# Patient Record
Sex: MALE | Race: WHITE | NOT HISPANIC OR LATINO | Employment: UNEMPLOYED | URBAN - NONMETROPOLITAN AREA
[De-identification: names, ages, dates, MRNs, and addresses within clinical notes are randomized per-mention and may not be internally consistent; named-entity substitution may affect disease eponyms.]

---

## 2024-11-21 ENCOUNTER — EVALUATION (OUTPATIENT)
Dept: PHYSICAL THERAPY | Facility: CLINIC | Age: 39
End: 2024-11-21
Payer: COMMERCIAL

## 2024-11-21 DIAGNOSIS — M21.542 ACQUIRED LEFT CLUBFOOT: ICD-10-CM

## 2024-11-21 DIAGNOSIS — M47.816 LUMBAR SPONDYLOSIS: ICD-10-CM

## 2024-11-21 DIAGNOSIS — M25.572 ACUTE LEFT ANKLE PAIN: Primary | ICD-10-CM

## 2024-11-21 DIAGNOSIS — M96.0 PSEUDARTHROSIS AFTER FUSION OR ARTHRODESIS: ICD-10-CM

## 2024-11-21 PROCEDURE — 97112 NEUROMUSCULAR REEDUCATION: CPT | Performed by: PHYSICAL THERAPIST

## 2024-11-21 PROCEDURE — 97162 PT EVAL MOD COMPLEX 30 MIN: CPT | Performed by: PHYSICAL THERAPIST

## 2024-11-21 PROCEDURE — 97110 THERAPEUTIC EXERCISES: CPT | Performed by: PHYSICAL THERAPIST

## 2024-11-21 NOTE — PROGRESS NOTES
PT Evaluation     Today's date: 2024  Patient name: Shivam Mccoy  : 1985  MRN: [id number]  Referring provider: Shabbir Webster DPM  Dx:   Encounter Diagnosis     ICD-10-CM    1. Acute left ankle pain  M25.572       2. Pseudarthrosis after fusion or arthrodesis  M96.0       3. Acquired left clubfoot  M21.542       4. Lumbar spondylosis  M47.816           Start Time: 1330  Stop Time: 1425  Total time in clinic (min): 55 minutes    Assessment  Impairments: abnormal gait, abnormal or restricted ROM, activity intolerance, impaired balance, impaired physical strength, lacks appropriate home exercise program, pain with function and activity limitations  Symptom irritability: moderate    Assessment details: Patient is a 40 y/o male s/p L ankle fusion surgery performed on 2024. Patient has well healed incision sites to the L ankle. There is noted hypomobility to the L talocrural joint of the L ankle appropriate with the previously mentioned surgery. He has a lack of proximal strength most noted to the L hip during strength assessment today. He has some tenderness noted over the plantar aspect fo the forefoot around the heads of the 1st and 2nd metatarsals. Patient was provided with a copy of hep with verbal understanding noted.   Understanding of Dx/Px/POC: good     Prognosis: good    Goals  STGs:  Patient will be independent with HEP by 2-3 visits.  Decrease ankle pain by 50% for improved tolerance with adls and home duties by 4-6 weeks.   Patient will be able to ambulate over household distances with the least necessary assistive device by 4-6 weeks.     LTGs:   Improve FOTO score from 35 to 55 indicating improved tolerance with activities involving the LE by discharge.   Patient will be able to ambulate over community distances without deviation and free of an assistance by discharge.  Patient will be able to ambulate up and down a flight of stairs without pain or deviation by discharge.   Improve L LE  strength to wnl for improved tolerance with adls and ambulation by discharge.     Plan  Patient would benefit from: skilled physical therapy  Planned modality interventions: cryotherapy    Planned therapy interventions: balance/weight bearing training, ADL retraining, manual therapy, patient education, strengthening, stretching, therapeutic activities, therapeutic exercise, flexibility, functional ROM exercises, gait training and home exercise program    Frequency: 2-3x week  Duration in weeks: 8  Plan of Care beginning date: 11/21/2024  Plan of Care expiration date: 1/16/2025  Treatment plan discussed with: patient  Plan details: Patient informed that from this point forward, to ensure adherence to the aforementioned plan of care, all or some of the treatment may be performed and carried out by a Physical Therapy Assistant (PTA) with supervision from a licensed Physical Therapist (PT) in accordance with [facility] Physical Therapy Practice Act.  Patient will continue to benefit from skilled physical therapy to address the functional deficits that were identified during the evaluation today. We will continue to progress the therapy program to address these functional deficits and achieve the established goals.         Subjective Evaluation    History of Present Illness  Date of surgery: 7/2/2024  Mechanism of injury: surgery  Mechanism of injury: Patient presents to out patient physical therapy with chief c/o L ankle pain. Patient is s/p L ankle revision fusion. He was NWB for 1.5 months and then WBAT in a high tide cam boot for 1.5 months. He has since transitioned into a regular sneaker and is WBAT. He is reporting a sensation of a wad of something being under the L heel when he is trying to WB to the L LE. Patient has his first surgery on the L foot in 2017 and further surgical intervention for fusion surgery in 2023 which failed so he had a revision surgery earlier this year. 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  Pain  Current pain ratin  At best pain rating: 3  At worst pain rating: 10  Location: L foot/ankle  Quality: discomfort, throbbing and dull ache  Relieving factors: rest and ice  Aggravating factors: standing, walking and stair climbing    Social Support    Employment status: not working      Objective     Active Range of Motion   Left Ankle/Foot   Dorsiflexion (kf): -2 degrees   Plantar flexion: 11 degrees   Inversion: -8 degrees   Eversion: 17 degrees   Great toe flexion: -12 degrees   Great toe extension: 52 degrees     Strength/Myotome Testing     Left Hip   Planes of Motion   Flexion: 4  Extension: 4+  Abduction: 4+  Adduction: 4+  External rotation: 3+  Internal rotation: 4+    Right Hip   Planes of Motion   Flexion: 4  Extension: 4+  Abduction: 4+  Adduction: 4+  External rotation: 4+  Internal rotation: 4+    Left Knee   Flexion: 4+  Extension: 4+    Right Knee   Flexion: 4+  Extension: 4    Left Ankle/Foot   Dorsiflexion: 3+  Plantar flexion: 3+  Inversion: 3+  Eversion: 3+    Right Ankle/Foot   Dorsiflexion: 4+  Plantar flexion: 4+  Inversion: 4+  Eversion: 4+    Tests   Left Ankle/Foot   Negative for Homans' sign.     Right Ankle/Foot   Negative for Homans' sign.              Precautions: Hx of L foot and ankle fusion surgery 2024.       Date  IE       FOTO 35 SC       Manuals                                        Neuro Re-Ed        Toe curls 5\" 10x       Toe yoga 5\" 10x       Toe splaying 15x                                       Ther Ex        Side lying hip ER isometric 5\" 15x       Clamshells 5\" 15x                                                       Ther Activity                        Gait Training             "            Modalities

## 2024-11-26 ENCOUNTER — EVALUATION (OUTPATIENT)
Dept: PHYSICAL THERAPY | Facility: CLINIC | Age: 39
End: 2024-11-26
Payer: COMMERCIAL

## 2024-11-26 DIAGNOSIS — M47.816 LUMBAR SPONDYLOSIS: Primary | ICD-10-CM

## 2024-11-26 PROCEDURE — 97110 THERAPEUTIC EXERCISES: CPT

## 2024-11-26 PROCEDURE — 97164 PT RE-EVAL EST PLAN CARE: CPT | Performed by: PHYSICAL THERAPIST

## 2024-11-26 PROCEDURE — 97112 NEUROMUSCULAR REEDUCATION: CPT

## 2024-11-26 NOTE — PROGRESS NOTES
"PT Evaluation     Today's date: 2024  Patient name: Shivam Mccoy  : 1985  MRN: [id number]  Referring provider: Shivam Juarez DO  Dx:   Encounter Diagnosis     ICD-10-CM    1. Lumbar spondylosis  M47.816             Start Time: 845  Stop Time: 930  Total time in clinic (min): 45 minutes    Assessment  Impairments: abnormal or restricted ROM, activity intolerance, impaired physical strength, lacks appropriate home exercise program and pain with function  Symptom irritability: moderate    Assessment details: Patient is here today for a re-evaluation to add his lumbar spine evaluation and treatment to his therapy sessions. He is presenting with mild to moderate limitations with lumbar rom during arom assessment with no restriction noted during prone PIVM assessment. There was no pain noted during PIVM assessment. Negative neural tension testing present to the Les. He has tenderness/muscle spasm to the B/L lumbar paraspinal musculature. Aberrant firing of the L multifidus musculature noted during prone assessment.   Understanding of Dx/Px/POC: good     Prognosis: good    Goals  STGs:  \"Patient will be independent with hep by 2-3 visits.   Decrease low back pain by 25% for improved tolerance with adls and home duties by 3-4 weeks.   Improve Lumbar rom to wfl for improved tolerance with adls and home duties by 3-4 weeks. \"    LTGs:  \"Improve FOTO score from 52 to 63 indicating improved tolerance with activities involving the low back by discharge.   Patient will demonstrate rom and strength to the lumbar spine wfl for improved tolerance with adls and home duties by discharge.   Patient will be free of radicular symptoms by discharge. \"      Plan  Patient would benefit from: skilled physical therapy  Planned modality interventions: cryotherapy and thermotherapy: hydrocollator packs    Planned therapy interventions: abdominal trunk stabilization, ADL retraining, body mechanics training, flexibility, " functional ROM exercises, home exercise program, therapeutic exercise, therapeutic activities, stretching, strengthening, postural training, patient education, joint mobilization and manual therapy    Frequency: 2x week  Duration in weeks: 6  Plan of Care beginning date: 11/26/2024  Plan of Care expiration date: 1/7/2025  Treatment plan discussed with: patient  Plan details: Patient informed that from this point forward, to ensure adherence to the aforementioned plan of care, all or some of the treatment may be performed and carried out by a Physical Therapy Assistant (PTA) with supervision from a licensed Physical Therapist (PT) in accordance with [facility] Physical Therapy Practice Act.  Patient will continue to benefit from skilled physical therapy to address the functional deficits that were identified during the evaluation today. We will continue to progress the therapy program to address these functional deficits and achieve the established goals.              Subjective Evaluation    History of Present Illness  Date of surgery: 7/2/2024  Mechanism of injury: surgery  Mechanism of injury: Patient presents to out patient physical therapy with chief c/o L ankle pain. Patient is s/p L ankle revision fusion. He was NWB for 1.5 months and then WBAT in a high tide cam boot for 1.5 months. He has since transitioned into a regular sneaker and is WBAT. He is reporting a sensation of a wad of something being under the L heel when he is trying to WB to the L LE. Patient has his first surgery on the L foot in 2017 and further surgical intervention for fusion surgery in 2023 which failed so he had a revision surgery earlier this year. Patient has a history of club foot     Update 11/26/2024:  Patient reports that his back has been bothering him since he was in [employer]. He reports intermittent radicular symptoms into the lower extremities at times. He feels that when he is sitting for prolonged periods of time  these symptoms will increase into the two legs. He has a known history of degeneration in his low back which he feels is from how he had had to compensate for his foot issues.           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  Pain  Current pain ratin  At best pain ratin  At worst pain ratin  Location: Lumbar  Quality: discomfort, throbbing, dull ache, radiating and sharp  Relieving factors: rest, ice and medications  Aggravating factors: standing, walking, lifting and sitting    Social Support    Employment status: not working      Objective     Palpation   Left   Muscle spasm in the erector spinae.   Tenderness of the erector spinae.     Right   Muscle spasm in the erector spinae.   Tenderness of the erector spinae.     Active Range of Motion     Lumbar   Flexion:  WFL  Extension:  with pain Restriction level: moderate  Left lateral flexion:  with pain Restriction level: minimal  Right lateral flexion:  with pain Restriction level: moderate  Left rotation:  WFL  Right rotation:  WFL  Left Ankle/Foot   Dorsiflexion (kf): -2 degrees   Plantar flexion: 11 degrees   Inversion: -8 degrees   Eversion: 17 degrees   Great toe flexion: -12 degrees   Great toe extension: 52 degrees     Strength/Myotome Testing     Left Hip   Planes of Motion   Flexion: 4  Extension: 4+  Abduction: 4+  Adduction: 4+  External rotation: 3+  Internal rotation: 4+    Right Hip   Planes of Motion   Flexion: 4  Extension: 4+  Abduction: 4+  Adduction: 4+  External rotation: 4+  Internal rotation: 4+    Left Knee   Flexion: 4+  Extension: 4+    Right Knee   Flexion: 4+  Extension: 4    Left Ankle/Foot   Dorsiflexion: 3+  Plantar flexion: 3+  Inversion: 3+  Eversion: 3+    Right Ankle/Foot   Dorsiflexion:  4+  Plantar flexion: 4+  Inversion: 4+  Eversion: 4+    Muscle Activation     Additional Muscle Activation Details  Aberrant firing of L multifidus.   Multifidus: R=4-/5, L=3+/5    Tests     Lumbar     Left   Negative passive SLR.     Right   Negative passive SLR.   Left Ankle/Foot   Negative for Homans' sign.     Right Ankle/Foot   Negative for Homans' sign.              Precautions: Hx of L foot and ankle fusion surgery 7/2/2024.     See treatment note by PTA for exercise chart.

## 2024-11-26 NOTE — PROGRESS NOTES
"Daily Note     Today's date: 2024  Patient name: Shivam Mccoy  : 1985  MRN: [id number]  Referring provider: Shivam Juarez DO  Dx:   Encounter Diagnosis     ICD-10-CM    1. Lumbar spondylosis  M47.816                      Subjective:  My ankle is sore and painful on both side os my lower leg.       Objective: See treatment diary below      Assessment: Tolerated treatment well. Patient would benefit from continued PT  pt began some new exercises today with good tolerance.  Pt needs verbal cues through out today to perform the exercises correctly. He reports having pain in his left ankle and lower leg through out as well as in both hips.       Plan: Continue per plan of care.      Precautions: Hx of L foot and ankle fusion surgery 2024.       Date  IE  Reassess add Lumbar      FOTO 35 SC 52 back  JF  SC      Manuals                                        Neuro Re-Ed        Toe curls 5\" 10x 5\" 10 x      Toe yoga 5\" 10x 5\" 10 x      Toe splaying 15x 15 x       SLB  5 x 10 \"      Tandem walk  3 x                       Ther Ex        Side lying hip ER isometric 5\" 15x 5 \" 15x      Clamshells 5\" 15x 5 15 x        Nustep  5 min L 5                                              Ther Activity                        Gait Training                        Modalities                               "

## 2024-11-26 NOTE — LETTER
2024    Shivam Juarez DO  [address] [place] [address]    Patient: Shivam Mccoy   YOB: 1985   Date of Visit: 2024     Encounter Diagnosis     ICD-10-CM    1. Lumbar spondylosis  M47.816           Dear Dr. Juarez:    Thank you for your recent referral of Shivam Mccoy. Please review the attached evaluation summary from Shivam's recent visit.     Please verify that you agree with the plan of care by signing the attached order.     If you have any questions or concerns, please do not hesitate to call.     I sincerely appreciate the opportunity to share in the care of one of your patients and hope to have another opportunity to work with you in the near future.       Sincerely,    Oskar Steve, PT      Referring Provider:      I certify that I have read the below Plan of Care and certify the need for these services furnished under this plan of treatment while under my care.                    Shivam Juarez DO  [address] [place] [address]  Via Fax: [phone]          PT Evaluation     Today's date: 2024  Patient name: Shivam Mccoy  : 1985  MRN: [id number]  Referring provider: Shivam Juarez DO  Dx:   Encounter Diagnosis     ICD-10-CM    1. Lumbar spondylosis  M47.816             Start Time: 0845  Stop Time: 0930  Total time in clinic (min): 45 minutes    Assessment  Impairments: abnormal or restricted ROM, activity intolerance, impaired physical strength, lacks appropriate home exercise program and pain with function  Symptom irritability: moderate    Assessment details: Patient is here today for a re-evaluation to add his lumbar spine evaluation and treatment to his therapy sessions. He is presenting with mild to moderate limitations with lumbar rom during arom assessment with no restriction noted during prone PIVM assessment. There was no pain noted during PIVM assessment. Negative neural tension  "testing present to the Les. He has tenderness/muscle spasm to the B/L lumbar paraspinal musculature. Aberrant firing of the L multifidus musculature noted during prone assessment.   Understanding of Dx/Px/POC: good     Prognosis: good    Goals  STGs:  \"Patient will be independent with hep by 2-3 visits.   Decrease low back pain by 25% for improved tolerance with adls and home duties by 3-4 weeks.   Improve Lumbar rom to wfl for improved tolerance with adls and home duties by 3-4 weeks. \"    LTGs:  \"Improve FOTO score from 52 to 63 indicating improved tolerance with activities involving the low back by discharge.   Patient will demonstrate rom and strength to the lumbar spine wfl for improved tolerance with adls and home duties by discharge.   Patient will be free of radicular symptoms by discharge. \"      Plan  Patient would benefit from: skilled physical therapy  Planned modality interventions: cryotherapy and thermotherapy: hydrocollator packs    Planned therapy interventions: abdominal trunk stabilization, ADL retraining, body mechanics training, flexibility, functional ROM exercises, home exercise program, therapeutic exercise, therapeutic activities, stretching, strengthening, postural training, patient education, joint mobilization and manual therapy    Frequency: 2x week  Duration in weeks: 6  Plan of Care beginning date: 11/26/2024  Plan of Care expiration date: 1/7/2025  Treatment plan discussed with: patient  Plan details: Patient informed that from this point forward, to ensure adherence to the aforementioned plan of care, all or some of the treatment may be performed and carried out by a Physical Therapy Assistant (PTA) with supervision from a licensed Physical Therapist (PT) in accordance with [facility] Physical Therapy Practice Act.  Patient will continue to benefit from skilled physical therapy to address the functional deficits that were identified during the evaluation today. We will " continue to progress the therapy program to address these functional deficits and achieve the established goals.              Subjective Evaluation    History of Present Illness  Date of surgery: 2024  Mechanism of injury: surgery  Mechanism of injury: Patient presents to out patient physical therapy with chief c/o L ankle pain. Patient is s/p L ankle revision fusion. He was NWB for 1.5 months and then WBAT in a high tide cam boot for 1.5 months. He has since transitioned into a regular sneaker and is WBAT. He is reporting a sensation of a wad of something being under the L heel when he is trying to WB to the L LE. Patient has his first surgery on the L foot in  and further surgical intervention for fusion surgery in  which failed so he had a revision surgery earlier this year. Patient has a history of club foot     Update 2024:  Patient reports that his back has been bothering him since he was in [employer]. He reports intermittent radicular symptoms into the lower extremities at times. He feels that when he is sitting for prolonged periods of time these symptoms will increase into the two legs. He has a known history of degeneration in his low back which he feels is from how he had had to compensate for his foot issues.           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  Pain  Current pain ratin  At best pain ratin  At worst pain ratin  Location: Lumbar  Quality: discomfort, throbbing, dull ache, radiating and sharp  Relieving factors: rest, ice and medications  Aggravating factors: standing, walking, lifting and sitting    Social Support    Employment status: not working      Objective     Palpation   Left   Muscle spasm in the erector spinae.    Tenderness of the erector spinae.     Right   Muscle spasm in the erector spinae.   Tenderness of the erector spinae.     Active Range of Motion     Lumbar   Flexion:  WFL  Extension:  with pain Restriction level: moderate  Left lateral flexion:  with pain Restriction level: minimal  Right lateral flexion:  with pain Restriction level: moderate  Left rotation:  WFL  Right rotation:  WFL  Left Ankle/Foot   Dorsiflexion (kf): -2 degrees   Plantar flexion: 11 degrees   Inversion: -8 degrees   Eversion: 17 degrees   Great toe flexion: -12 degrees   Great toe extension: 52 degrees     Strength/Myotome Testing     Left Hip   Planes of Motion   Flexion: 4  Extension: 4+  Abduction: 4+  Adduction: 4+  External rotation: 3+  Internal rotation: 4+    Right Hip   Planes of Motion   Flexion: 4  Extension: 4+  Abduction: 4+  Adduction: 4+  External rotation: 4+  Internal rotation: 4+    Left Knee   Flexion: 4+  Extension: 4+    Right Knee   Flexion: 4+  Extension: 4    Left Ankle/Foot   Dorsiflexion: 3+  Plantar flexion: 3+  Inversion: 3+  Eversion: 3+    Right Ankle/Foot   Dorsiflexion: 4+  Plantar flexion: 4+  Inversion: 4+  Eversion: 4+    Muscle Activation     Additional Muscle Activation Details  Aberrant firing of L multifidus.   Multifidus: R=4-/5, L=3+/5    Tests     Lumbar     Left   Negative passive SLR.     Right   Negative passive SLR.   Left Ankle/Foot   Negative for Homans' sign.     Right Ankle/Foot   Negative for Homans' sign.              Precautions: Hx of L foot and ankle fusion surgery 2024.     See treatment note by PTA for exercise chart.           Daily Note     Today's date: 2024  Patient name: Shivam Mccoy  : 1985  MRN: [id number]  Referring provider: Shivam Juarez DO  Dx:   Encounter Diagnosis     ICD-10-CM    1. Lumbar spondylosis  M47.816                      Subjective:  My ankle is sore and painful on both side os my lower leg.       Objective: See treatment diary  "below      Assessment: Tolerated treatment well. Patient would benefit from continued PT  pt began some new exercises today with good tolerance.  Pt needs verbal cues through out today to perform the exercises correctly. He reports having pain in his left ankle and lower leg through out as well as in both hips.       Plan: Continue per plan of care.      Precautions: Hx of L foot and ankle fusion surgery 7/2/2024.       Date 11/21 IE 11/26 Reassess add Lumbar      FOTO 35 SC 52 back  JF  SC      Manuals                                        Neuro Re-Ed        Toe curls 5\" 10x 5\" 10 x      Toe yoga 5\" 10x 5\" 10 x      Toe splaying 15x 15 x       SLB  5 x 10 \"      Tandem walk  3 x                       Ther Ex        Side lying hip ER isometric 5\" 15x 5 \" 15x      Clamshells 5\" 15x 5 15 x        Nustep  5 min L 5                                              Ther Activity                        Gait Training                        Modalities                                               "

## 2024-11-29 ENCOUNTER — OFFICE VISIT (OUTPATIENT)
Dept: PHYSICAL THERAPY | Facility: CLINIC | Age: 39
End: 2024-11-29
Payer: COMMERCIAL

## 2024-11-29 DIAGNOSIS — M96.0 PSEUDARTHROSIS AFTER FUSION OR ARTHRODESIS: ICD-10-CM

## 2024-11-29 DIAGNOSIS — M25.572 ACUTE LEFT ANKLE PAIN: ICD-10-CM

## 2024-11-29 DIAGNOSIS — M47.816 LUMBAR SPONDYLOSIS: Primary | ICD-10-CM

## 2024-11-29 PROCEDURE — 97530 THERAPEUTIC ACTIVITIES: CPT

## 2024-11-29 PROCEDURE — 97110 THERAPEUTIC EXERCISES: CPT

## 2024-11-29 NOTE — PROGRESS NOTES
"Daily Note     Today's date: 2024  Patient name: Shivam Mccoy  : 1985  MRN: [id number]  Referring provider: Shabbir Webster DPM  Dx: No diagnosis found.               Subjective: Did a lot of walking yesterday and is very sore this morning.  Unable to take antiinflammatory until after blood work next week.  Waiting on call for [place] brace for R ankle.        Objective: See treatment diary below      Assessment: Pt with more soreness today having been on hold from antiinflammatory.  Tends to walk with LLE IR and in-toeing.  Will beneift from progression of glute strengthening and ankle/foot mobility      Plan: Continue per plan of care.      Precautions: Hx of L foot and ankle fusion surgery 2024.       Date  IE  Reassess add Lumbar      FOTO 35 SC 52 back  JF  SC      Manuals                                        Neuro Re-Ed        Toe curls 5\" 10x 5\" 10 x 15x5\"     Toe yoga 5\" 10x 5\" 10 x 15x ea      Toe splaying 15x 15 x  15x 5\"     SLB  5 x 10 \" 5x10\" B     Tandem walk  3 x  3x fwd and back                     Ther Ex        Side lying hip ER isometric 5\" 15x 5 \" 15x      Clamshells 5\" 15x 5 15 x   15x 5\"     Nustep  5 min L 5      Ankle PF/DF   10ea     Ankle Inv/Ev    10ea     Adduction ball squeeze    15x 5\"                      Ther Activity                        Gait Training                        Modalities                                 "

## 2024-12-03 ENCOUNTER — APPOINTMENT (OUTPATIENT)
Dept: LAB | Facility: CLINIC | Age: 39
End: 2024-12-03
Payer: COMMERCIAL

## 2024-12-03 ENCOUNTER — OFFICE VISIT (OUTPATIENT)
Dept: PHYSICAL THERAPY | Facility: CLINIC | Age: 39
End: 2024-12-03
Payer: COMMERCIAL

## 2024-12-03 DIAGNOSIS — M25.572 ACUTE LEFT ANKLE PAIN: ICD-10-CM

## 2024-12-03 DIAGNOSIS — M45.7 ANKYLOSING SPONDYLITIS OF LUMBOSACRAL REGION (HCC): ICD-10-CM

## 2024-12-03 DIAGNOSIS — M96.0 PSEUDARTHROSIS AFTER FUSION OR ARTHRODESIS: Primary | ICD-10-CM

## 2024-12-03 DIAGNOSIS — M25.50 POLYARTHRALGIA: ICD-10-CM

## 2024-12-03 DIAGNOSIS — M47.816 LUMBAR SPONDYLOSIS: ICD-10-CM

## 2024-12-03 LAB
ANA SER QL IA: NEGATIVE
B BURGDOR IGG+IGM SER QL IA: NEGATIVE
CRP SERPL QL: 7.1 MG/L
ERYTHROCYTE [SEDIMENTATION RATE] IN BLOOD: 15 MM/HOUR (ref 0–14)

## 2024-12-03 PROCEDURE — 36415 COLL VENOUS BLD VENIPUNCTURE: CPT

## 2024-12-03 PROCEDURE — 86200 CCP ANTIBODY: CPT

## 2024-12-03 PROCEDURE — 86140 C-REACTIVE PROTEIN: CPT

## 2024-12-03 PROCEDURE — 97110 THERAPEUTIC EXERCISES: CPT

## 2024-12-03 PROCEDURE — 86430 RHEUMATOID FACTOR TEST QUAL: CPT

## 2024-12-03 PROCEDURE — 85652 RBC SED RATE AUTOMATED: CPT

## 2024-12-03 PROCEDURE — 86038 ANTINUCLEAR ANTIBODIES: CPT

## 2024-12-03 PROCEDURE — 97112 NEUROMUSCULAR REEDUCATION: CPT

## 2024-12-03 PROCEDURE — 86618 LYME DISEASE ANTIBODY: CPT

## 2024-12-03 NOTE — PROGRESS NOTES
"Daily Note     Today's date: 12/3/2024  Patient name: Shivam Mccoy  : 1985  MRN: [id number]  Referring provider: Shabbir Webster DPM  Dx:   Encounter Diagnosis     ICD-10-CM    1. Pseudarthrosis after fusion or arthrodesis  M96.0       2. Acute left ankle pain  M25.572       3. Lumbar spondylosis  M47.816           Start Time: 0845  Stop Time: 09  Total time in clinic (min): 45 minutes    Subjective:  I am sore today.  I see the Dr for the nerve block on .      Objective: See treatment diary below      Assessment: Tolerated treatment well. Patient would benefit from continued PT   pt c/o most of his pain over his lateral left foot, heel and up his latera left lower leg. Pt reports feeling tightness in his low back all of the time and gets more sore when walking and doing things around his home.  He states that when he is sitting and not doing much the pain in his back isnt as bad., pt needs verbal cues through out his program to perform the exercises correctly.  Pt reports having more pain leaving the clinic with wt bearing.       Plan: Continue per plan of care.      Precautions: Hx of L foot and ankle fusion surgery 2024.       Date  IE  Reassess add Lumbar 11/29 12/3    FOTO 35 SC 52 back  JF  SC      Manuals                                        Neuro Re-Ed        Toe curls 5\" 10x 5\" 10 x 15x5\" 15 x  5 \"     Toe yoga 5\" 10x 5\" 10 x 15x ea  15 x     Toe splaying 15x 15 x  15x 5\" 15 x 5\"    SLB  5 x 10 \" 5x10\" B 5x  10 \" both    Tandem walk  3 x  3x fwd and back 4x F / B                    Ther Ex        Side lying hip ER isometric 5\" 15x 5 \" 15x  5\" 15 x     Clamshells 5\" 15x 5 15 x   15x 5\" 15 x  5\"    Nustep  5 min L 5  5 min L 5    Ankle PF/DF   10ea 10 x    Ankle Inv/Ev    10ea 10 x    Adduction ball squeeze    15x 5\"  15x 5\"                    Ther Activity                        Gait Training                        Modalities                                   "

## 2024-12-04 LAB — RHEUMATOID FACT SER QL LA: NEGATIVE

## 2024-12-06 ENCOUNTER — OFFICE VISIT (OUTPATIENT)
Dept: PHYSICAL THERAPY | Facility: CLINIC | Age: 39
End: 2024-12-06
Payer: COMMERCIAL

## 2024-12-06 DIAGNOSIS — M25.572 ACUTE LEFT ANKLE PAIN: ICD-10-CM

## 2024-12-06 DIAGNOSIS — M21.542 ACQUIRED LEFT CLUBFOOT: ICD-10-CM

## 2024-12-06 DIAGNOSIS — M96.0 PSEUDARTHROSIS AFTER FUSION OR ARTHRODESIS: Primary | ICD-10-CM

## 2024-12-06 DIAGNOSIS — M47.816 LUMBAR SPONDYLOSIS: ICD-10-CM

## 2024-12-06 LAB — CCP AB SER IA-ACNC: 2.8

## 2024-12-06 PROCEDURE — 97110 THERAPEUTIC EXERCISES: CPT | Performed by: PHYSICAL THERAPIST

## 2024-12-06 PROCEDURE — 97112 NEUROMUSCULAR REEDUCATION: CPT | Performed by: PHYSICAL THERAPIST

## 2024-12-06 NOTE — PROGRESS NOTES
"Daily Note     Today's date: 2024  Patient name: Shivam Mccoy  : 1985  MRN: [id number]  Referring provider: Shabbir Webster DPM  Dx:   Encounter Diagnosis     ICD-10-CM    1. Pseudarthrosis after fusion or arthrodesis  M96.0       2. Acute left ankle pain  M25.572       3. Lumbar spondylosis  M47.816       4. Acquired left clubfoot  M21.542           Start Time: 0848  Stop Time: 930  Total time in clinic (min): 42 minutes    Subjective: Patient reports continued tension to the lateral aspect of the L foot and up into the lower aspect of the L lower leg. He notes that he continues to have increased pain when the weather is cold.       Objective: See treatment diary below      Assessment: Tolerated treatment fair. Patient demonstrated fatigue post treatment, exhibited good technique with therapeutic exercises, and would benefit from continued PT Continued with progression of LE strengthening throughout the therapy session today. He offered no reports of increase to baseline pain levels throughout the session.       Plan: Continue per plan of care.  Progress treatment as tolerated.       Precautions: Hx of L foot and ankle fusion surgery 2024.       Date  IE  Reassess add Lumbar 11/29 12/3 12/6   FOTO 35 SC 52 back  JF  SC      Manuals                                        Neuro Re-Ed        Toe curls 5\" 10x 5\" 10 x 15x5\" 15 x  5 \"  5\" 20x   Toe yoga 5\" 10x 5\" 10 x 15x ea  15 x  5\" 20x   Toe splaying 15x 15 x  15x 5\" 15 x 5\" 5\" 20x   SLB  5 x 10 \" 5x10\" B 5x  10 \" both 15\" 5x Airex   Tandem walk  3 x  3x fwd and back 4x F / B 5 laps   Side stepping on airex     5 laps           Ther Ex        Side lying hip ER isometric 5\" 15x 5 \" 15x  5\" 15 x  5\" 15x   Clamshells 5\" 15x 5 15 x   15x 5\" 15 x  5\"    Nustep  5 min L 5  5 min L 5 L5 5 min   Ankle PF/DF   10ea 10 x    Ankle Inv/Ev    10ea 10 x    Adduction ball squeeze    15x 5\"  15x 5\"    Hip machine     Abd 30# 20x ea.    Bridges     NV "   Ther Activity                        Gait Training                        Modalities

## 2024-12-09 ENCOUNTER — OFFICE VISIT (OUTPATIENT)
Dept: PHYSICAL THERAPY | Facility: CLINIC | Age: 39
End: 2024-12-09
Payer: COMMERCIAL

## 2024-12-09 DIAGNOSIS — M47.816 LUMBAR SPONDYLOSIS: Primary | ICD-10-CM

## 2024-12-09 DIAGNOSIS — M96.0 PSEUDARTHROSIS AFTER FUSION OR ARTHRODESIS: ICD-10-CM

## 2024-12-09 DIAGNOSIS — M25.572 ACUTE LEFT ANKLE PAIN: ICD-10-CM

## 2024-12-09 PROCEDURE — 97110 THERAPEUTIC EXERCISES: CPT

## 2024-12-09 PROCEDURE — 97112 NEUROMUSCULAR REEDUCATION: CPT

## 2024-12-09 NOTE — PROGRESS NOTES
"Daily Note     Today's date: 2024  Patient name: Shivam Mccoy  : 1985  MRN: [id number]  Referring provider: Shabbir Webster DPM  Dx:   Encounter Diagnosis     ICD-10-CM    1. Lumbar spondylosis  M47.816       2. Acute left ankle pain  M25.572       3. Pseudarthrosis after fusion or arthrodesis  M96.0           Start Time: 1015  Stop Time: 1100  Total time in clinic (min): 45 minutes    Subjective:  My foot is sore.       Objective: See treatment diary below      Assessment: Tolerated treatment well. Patient would benefit from continued PT  pt reports having pain through out his session today. He completed all of his exercises with pain levels staying about the same.   He did well with mat exercises today with good tolerance.  He continues to wear his ankle brace on his left ankle.,       Plan: Continue per plan of care.      Precautions: Hx of L foot and ankle fusion surgery 2024.       Date  Reassess add Lumbar 11/29 12/3 12/6   FOTO  52 back  JF  SC      Manuals                                        Neuro Re-Ed        Toe curls 5\" 20 x 5\" 10 x 15x5\" 15 x  5 \"  5\" 20x   Toe yoga 5\" 20x  5\" 10 x 15x ea  15 x  5\" 20x   Toe splaying 15x 15 x  15x 5\" 15 x 5\" 5\" 20x   SLB 15'5 x airex 5 x 10 \" 5x10\" B 5x  10 \" both 15\" 5x Airex   Tandem walk 5 laps 3 x  3x fwd and back 4x F / B 5 laps   Side stepping on airex 5 laps    5 laps           Ther Ex        Side lying hip ER isometric 5\" 15x 5 \" 15x  5\" 15 x  5\" 15x   Clamshells 5\" 15x 5 15 x   15x 5\" 15 x  5\"    Nustep L 5 6 min 5 min L 5  5 min L 5 L5 5 min   Ankle PF/DF   10ea 10 x    Ankle Inv/Ev    10ea 10 x    Adduction ball squeeze    15x 5\"  15x 5\"    Hip machine Abd  30 #  20 x    Abd 30# 20x ea.    Bridges 20 x     NV   Ther Activity                        Gait Training                        Modalities                                       "

## 2024-12-11 ENCOUNTER — OFFICE VISIT (OUTPATIENT)
Dept: PHYSICAL THERAPY | Facility: CLINIC | Age: 39
End: 2024-12-11
Payer: COMMERCIAL

## 2024-12-11 DIAGNOSIS — M21.542 ACQUIRED LEFT CLUBFOOT: ICD-10-CM

## 2024-12-11 DIAGNOSIS — M96.0 PSEUDARTHROSIS AFTER FUSION OR ARTHRODESIS: ICD-10-CM

## 2024-12-11 DIAGNOSIS — M47.816 LUMBAR SPONDYLOSIS: ICD-10-CM

## 2024-12-11 DIAGNOSIS — M25.572 ACUTE LEFT ANKLE PAIN: Primary | ICD-10-CM

## 2024-12-11 PROCEDURE — 97110 THERAPEUTIC EXERCISES: CPT | Performed by: PHYSICAL THERAPIST

## 2024-12-11 PROCEDURE — 97112 NEUROMUSCULAR REEDUCATION: CPT | Performed by: PHYSICAL THERAPIST

## 2024-12-11 PROCEDURE — 97140 MANUAL THERAPY 1/> REGIONS: CPT | Performed by: PHYSICAL THERAPIST

## 2024-12-11 NOTE — PROGRESS NOTES
"Daily Note     Today's date: 2024  Patient name: Shivam Mccoy  : 1985  MRN: [id number]  Referring provider: Shabbir Webster DPM  Dx:   Encounter Diagnosis     ICD-10-CM    1. Acute left ankle pain  M25.572       2. Lumbar spondylosis  M47.816       3. Pseudarthrosis after fusion or arthrodesis  M96.0       4. Acquired left clubfoot  M21.542           Start Time: 0845  Stop Time: 948  Total time in clinic (min): 63 minutes    Subjective: Patient says his foot is cranky, and is taking meds. During the session, patient complained of foot pain that radiates throughout the lower leg. Still feels just about the same as last session and is planning to see the doctor later.       Objective: See treatment diary below      Assessment: Tolerated treatment well. Patient exhibited good technique with therapeutic exercises and would benefit from continued PT. Patient complained of discomfort throughout the session but tolerated and performed each exercise, as additional resistance was added with foot doming and and side lying ER isometric. Resistance was added to address the lack of strength at the left foot. Mild restriction was seen throughout the rest of the of the foot during manual, with most resistance at the third metatarsal.       Plan: Continue per plan of care.      Precautions: Hx of L foot and ankle fusion surgery 2024.       Date 12/9 12/11 11/29 12/3 12/6   FOTO        Manuals        PROM Forefoot/Midfoot  8 min                              Neuro Re-Ed        Toe curls 5\" 20 x  15x5\" 15 x  5 \"  5\" 20x   Toe yoga 5\" 20x  5\" 20x 15x ea  15 x  5\" 20x   Toe splaying 15x  15x 5\" 15 x 5\" 5\" 20x   SLB 15'5 x airex 20'5 x airex 5x10\" B 5x  10 \" both 15\" 5x Airex   Tandem walk 5 laps 7 laps 3x fwd and back 4x F / B 5 laps   Side stepping on airex 5 laps 7 laps    5 laps   Foot doming  5\" 20x              Ther Ex        Side lying hip ER isometric 5\" 15x 5\" 20x 2 #  5\" 15 x  5\" 15x   Clamshells    5\" 15x  " "15x 5\" 15 x  5\"    Clamshells L2  10\" 10x      Nustep L 5 6 min   5 min L 5 L5 5 min   Recumbent bike  L 1 7 min      Ankle PF/DF   10ea 10 x    Ankle Inv/Ev    10ea 10 x    Adduction ball squeeze    15x 5\"  15x 5\"    Hip machine Abd  30 #  20 x Abd  30 #  20 x   Abd 30# 20x ea.    Back extension  2x15  55 #      Bridges 20 x     NV   Ther Activity                        Gait Training                        Modalities                                         "

## 2024-12-16 ENCOUNTER — OFFICE VISIT (OUTPATIENT)
Dept: PHYSICAL THERAPY | Facility: CLINIC | Age: 39
End: 2024-12-16
Payer: COMMERCIAL

## 2024-12-16 DIAGNOSIS — M96.0 PSEUDARTHROSIS AFTER FUSION OR ARTHRODESIS: Primary | ICD-10-CM

## 2024-12-16 DIAGNOSIS — M25.572 ACUTE LEFT ANKLE PAIN: ICD-10-CM

## 2024-12-16 DIAGNOSIS — M47.816 LUMBAR SPONDYLOSIS: ICD-10-CM

## 2024-12-16 PROCEDURE — 97112 NEUROMUSCULAR REEDUCATION: CPT

## 2024-12-16 PROCEDURE — 97110 THERAPEUTIC EXERCISES: CPT

## 2024-12-16 PROCEDURE — 97140 MANUAL THERAPY 1/> REGIONS: CPT

## 2024-12-16 NOTE — PROGRESS NOTES
"Daily Note     Today's date: 2024  Patient name: Shivam Mccoy  : 1985  MRN: [id number]  Referring provider: Shabbir Webster DPM  Dx:   Encounter Diagnosis     ICD-10-CM    1. Pseudarthrosis after fusion or arthrodesis  M96.0       2. Lumbar spondylosis  M47.816       3. Acute left ankle pain  M25.572           Start Time: 1015  Stop Time: 1100  Total time in clinic (min): 45 minutes    Subjective:  I wasn't on my feet a lot yesterday so my foot isnt too bad so far.  My back is feeling ok., I have pain certain things., I always feel pressure. I am going to get the nerves burned on . I saw the Dr and he wants me to continue for 6 more weeks.       Objective: See treatment diary below      Assessment: Tolerated treatment well. Patient would benefit from continued PT    pt went through his exercises today with no change in his pain levels.  He had some mild back pain today through out. He states that the more he walks and is on his feet the pain in his back gets worse.  He completed his foot and ankle exercises with some pain with all exercises. He is still using the ankle brace when walking.       Plan: Continue per plan of care.      Precautions: Hx of L foot and ankle fusion surgery 2024.       Date 12/9 12/11 12/16 12/3 12/6   FOTO   Back 48   Ankle 38   JF     Manuals        PROM Forefoot/Midfoot  8 min 8 min                             Neuro Re-Ed        Toe curls 5\" 20 x  15x5\" 15 x  5 \"  5\" 20x   Toe yoga 5\" 20x  5\" 20x 15x ea   5\"  15 x  5\" 20x   Toe splaying 15x  15x 5\" 15 x 5\" 5\" 20x   SLB 15'5 x airex 20'5 x airex 5x10\" B  5x  10 \" both 15\" 5x Airex   Tandem walk 5 laps 7 laps 7 laps 4x F / B 5 laps   Side stepping on airex 5 laps 7 laps  7 laps  5 laps   Foot doming  5\" 20x 5\"  20 x             Ther Ex        Side lying hip ER isometric 5\" 15x 5\" 20x 2 # 5\" 20 x 2 # 5\" 15 x  5\" 15x   Clamshells    5\" 15x  15x 5\" 15 x  5\"    Clamshells L2  10\" 10x 10\" 10 x     Nustep L 5 6 min  8 " "min   L 6 5 min L 5 L5 5 min   Recumbent bike  L 1 7 min L 1 7 min     Ankle PF/DF    10 x    Ankle Inv/Ev     10 x    Adduction ball squeeze     15x 5\"    Hip machine Abd  30 #  20 x Abd  30 #  20 x Abd  30 # 20 x  Abd 30# 20x ea.    Back extension  2x15  55 # 2x15   85#     Bridges 20 x   20 x  NV   Ther Activity                        Gait Training                        Modalities                                           "

## 2024-12-18 ENCOUNTER — OFFICE VISIT (OUTPATIENT)
Dept: PHYSICAL THERAPY | Facility: CLINIC | Age: 39
End: 2024-12-18
Payer: COMMERCIAL

## 2024-12-18 DIAGNOSIS — M25.572 ACUTE LEFT ANKLE PAIN: Primary | ICD-10-CM

## 2024-12-18 DIAGNOSIS — M96.0 PSEUDARTHROSIS AFTER FUSION OR ARTHRODESIS: ICD-10-CM

## 2024-12-18 DIAGNOSIS — M47.816 LUMBAR SPONDYLOSIS: ICD-10-CM

## 2024-12-18 PROCEDURE — 97112 NEUROMUSCULAR REEDUCATION: CPT

## 2024-12-18 PROCEDURE — 97140 MANUAL THERAPY 1/> REGIONS: CPT

## 2024-12-18 PROCEDURE — 97110 THERAPEUTIC EXERCISES: CPT

## 2024-12-18 NOTE — PROGRESS NOTES
"Daily Note     Today's date: 2024  Patient name: Shivam Mccoy  : 1985  MRN: [id number]  Referring provider: Shabbir Webster DPM  Dx:   Encounter Diagnosis     ICD-10-CM    1. Acute left ankle pain  M25.572       2. Lumbar spondylosis  M47.816       3. Pseudarthrosis after fusion or arthrodesis  M96.0           Start Time: 1015  Stop Time: 1100  Total time in clinic (min): 45 minutes    Subjective:  I feel better when I take my anti inflammatory meds.  My foot is sore .      Objective: See treatment diary below      Assessment: Tolerated treatment well. Patient would benefit from continued PT  we increased his reps for some exercises and reports having some mild pain in his right low back.  Pt completed his back exercises with some mild pain through out.  He states that he feels some improvement in his left foot since doing the exercises.  He has most of his pain over his left heel and over the outside of his left foot.  He continues to wear the ankle brace on his left ankle.       Plan: Continue per plan of care.      Precautions: Hx of L foot and ankle fusion surgery 2024.       Date    FOTO   Back 48   Ankle 38   JF     Manuals        PROM Forefoot/Midfoot  8 min 8 min 8 min                            Neuro Re-Ed        Toe curls 5\" 20 x  15x5\" 15 x  5 \"  5\" 20x   Toe yoga 5\" 20x  5\" 20x 15x ea   5\"  15 x  5\" 20x   Toe splaying 15x  15x 5\" 15 x 5\" 5\" 20x   SLB 15'5 x airex 20'5 x airex 5x10\" B  5x  10 \" both 15\" 5x Airex   Tandem walk 5 laps 7 laps 7 laps 7 laps 5 laps   Side stepping on airex 5 laps 7 laps  7 laps 7 laps 5 laps   Foot doming  5\" 20x 5\"  20 x 5\" 20 x            Ther Ex        Side lying hip ER isometric 5\" 15x 5\" 20x 2 # 5\" 20 x 2 # 5\" 15 x  5\" 15x   Clamshells    5\" 15x  15x 5\" 15 x  5\"    Clamshells L2  10\" 10x 10\" 10 x 10 \" 10 x    Nustep L 5 6 min  8 min   L 6 5 min L 5 L5 5 min   Recumbent bike  L 1 7 min L 1 7 min 8 min L 1    Ankle PF/DF      " "  Ankle Inv/Ev         Adduction ball squeeze         Hip machine Abd  30 #  20 x Abd  30 #  20 x Abd  30 # 20 x Abd 30 #  20 x Abd 30# 20x ea.    Back extension  2x15  55 # 2x15   85# 2 x 15   85#    Bridges 20 x   20 x  30 x  5 \" NV   Ther Activity                        Gait Training                        Modalities                                             "

## 2024-12-26 ENCOUNTER — EVALUATION (OUTPATIENT)
Dept: PHYSICAL THERAPY | Facility: CLINIC | Age: 39
End: 2024-12-26
Payer: COMMERCIAL

## 2024-12-26 DIAGNOSIS — M25.572 ACUTE LEFT ANKLE PAIN: Primary | ICD-10-CM

## 2024-12-26 DIAGNOSIS — M96.0 PSEUDARTHROSIS AFTER FUSION OR ARTHRODESIS: ICD-10-CM

## 2024-12-26 DIAGNOSIS — M47.816 LUMBAR SPONDYLOSIS: ICD-10-CM

## 2024-12-26 DIAGNOSIS — M21.542 ACQUIRED LEFT CLUBFOOT: ICD-10-CM

## 2024-12-26 PROCEDURE — 97110 THERAPEUTIC EXERCISES: CPT | Performed by: PHYSICAL THERAPIST

## 2024-12-26 PROCEDURE — 97140 MANUAL THERAPY 1/> REGIONS: CPT | Performed by: PHYSICAL THERAPIST

## 2024-12-26 PROCEDURE — 97112 NEUROMUSCULAR REEDUCATION: CPT | Performed by: PHYSICAL THERAPIST

## 2024-12-26 NOTE — PROGRESS NOTES
PT Re-Evaluation     Today's date: 2024  Patient name: Shivam Mccoy  : 1985  MRN: [id number]  Referring provider: Shabbir Webster DPM  Dx:   Encounter Diagnosis     ICD-10-CM    1. Acute left ankle pain  M25.572       2. Pseudarthrosis after fusion or arthrodesis  M96.0       3. Lumbar spondylosis  M47.816       4. Acquired left clubfoot  M21.542           Start Time: 1500  Stop Time: 1545  Total time in clinic (min): 45 minutes    Assessment  Impairments: abnormal gait, abnormal or restricted ROM, activity intolerance, impaired balance, impaired physical strength, lacks appropriate home exercise program, pain with function and activity limitations  Symptom irritability: moderate    Assessment details: Patient has attended 10 physical therapy visits to date. He continues to restriction to L ankle mobility which is to be expected secondary to the surgical procedure he had performed. He continues to present with weakness of the L ankle through the available range. Great toe mobility is improving along with intrinsic strength as noted during therapy interventions. There was improved tolerance for ambulation and balance activities today with less UE assistance required.   Understanding of Dx/Px/POC: good     Prognosis: good    Goals  STGs:  Patient will be independent with HEP by 2-3 visits. - MET  Decrease ankle pain by 50% for improved tolerance with adls and home duties by 4-6 weeks. - Progressing  Patient will be able to ambulate over household distances with the least necessary assistive device by 4-6 weeks. - Progressing    LTGs:   Improve FOTO score from 35 to 55 indicating improved tolerance with activities involving the LE by discharge. - Progressing  Patient will be able to ambulate over community distances without deviation and free of an assistance by discharge. - Not MET  Patient will be able to ambulate up and down a flight of stairs without pain or deviation by discharge. -  Progressing  Improve L LE strength to wnl for improved tolerance with adls and ambulation by discharge. - Progressing    Plan  Patient would benefit from: skilled physical therapy  Planned modality interventions: cryotherapy    Planned therapy interventions: balance/weight bearing training, ADL retraining, manual therapy, patient education, strengthening, stretching, therapeutic activities, therapeutic exercise, flexibility, functional ROM exercises, gait training and home exercise program    Frequency: 2-3x week  Duration in weeks: 4  Plan of Care beginning date: 12/26/2024  Plan of Care expiration date: 1/23/2025  Treatment plan discussed with: patient  Plan details: Patient informed that from this point forward, to ensure adherence to the aforementioned plan of care, all or some of the treatment may be performed and carried out by a Physical Therapy Assistant (PTA) with supervision from a licensed Physical Therapist (PT) in accordance with [facility] Physical Therapy Practice Act.  Patient will continue to benefit from skilled physical therapy to address the functional deficits that were identified during the evaluation today. We will continue to progress the therapy program to address these functional deficits and achieve the established goals.         Subjective Evaluation    History of Present Illness  Date of surgery: 7/2/2024  Mechanism of injury: surgery  Mechanism of injury: Patient presents to out patient physical therapy with chief c/o L ankle pain. Patient is s/p L ankle revision fusion. He was NWB for 1.5 months and then WBAT in a high tide cam boot for 1.5 months. He has since transitioned into a regular sneaker and is WBAT. He is reporting a sensation of a wad of something being under the L heel when he is trying to WB to the L LE. Patient has his first surgery on the L foot in 2017 and further surgical intervention for fusion surgery in 2023 which failed so he had a revision surgery earlier  this year. Patient has a history of club foot     Update 2024:  Patient reports that he continues to have difficulties with tolerance for standing/walking due to increased pain in the L ankle/foot. He feels that most of the pain is on the plantar heel of the L foot. He notes that when he is off of his feet he does not have pain but finds that there is some discomfort in the ankle and foot itself.           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  Pain  Current pain ratin  At best pain rating: 3  At worst pain rating: 10  Location: L foot/ankle  Quality: discomfort, throbbing, dull ache and pressure  Relieving factors: rest and ice  Aggravating factors: standing, walking and stair climbing    Social Support    Employment status: not working      Objective     Active Range of Motion   Left Ankle/Foot   Dorsiflexion (kf): -4 degrees   Plantar flexion: 16 degrees   Inversion: 1 degrees   Eversion: 17 degrees   Great toe flexion: -16 degrees   Great toe extension: 66 degrees     Strength/Myotome Testing     Left Hip   Planes of Motion   Flexion: 4  Extension: 4+  Abduction: 4+  Adduction: 4+  External rotation: 4-  Internal rotation: 4+    Right Hip   Planes of Motion   Flexion: 4  Extension: 4+  Abduction: 4+  Adduction: 4+  External rotation: 4+  Internal rotation: 4+    Left Knee   Flexion: 4+  Extension: 4+    Right Knee   Flexion: 4+  Extension: 4    Left Ankle/Foot   Dorsiflexion: 3+  Plantar flexion: 3+  Inversion: 3+  Eversion: 3+    Right Ankle/Foot   Dorsiflexion: 4+  Plantar flexion: 4+  Inversion: 4+  Eversion: 4+    Tests   Left Ankle/Foot   Negative for Homans' sign.     Right Ankle/Foot   Negative for Homans' sign.     Ambulation     Observational Gait   Gait: antalgic   Decreased  "walking speed.              Precautions: Hx of L foot and ankle fusion surgery 7/2/2024.       Date 12/9 12/11 12/16 12/18 12/26 Reassess Foot   FOTO   Back 48   Ankle 38   JF     Manuals        PROM Forefoot/Midfoot  8 min 8 min 8 min 8 min                           Neuro Re-Ed        Toe curls 5\" 20 x  15x5\" 15 x  5 \"     Toe yoga 5\" 20x  5\" 20x 15x ea   5\"  15 x     Toe splaying 15x  15x 5\" 15 x 5\"    SLB 15'5 x airex 20'5 x airex 5x10\" B  5x  10 \" both 20\" 4x Airex   Tandem walk 5 laps 7 laps 7 laps 7 laps 7 laps   Side stepping on airex 5 laps 7 laps  7 laps 7 laps 7 laps   Foot doming  5\" 20x 5\"  20 x 5\" 20 x            Ther Ex        Side lying hip ER isometric 5\" 15x 5\" 20x 2 # 5\" 20 x 2 # 5\" 15 x     Clamshells L2  10\" 10x 10\" 10 x 10 \" 10 x    Nustep L 5 6 min  8 min   L 6 5 min L 5 L7 6 min   Recumbent bike  L 1 7 min L 1 7 min 8 min L 1    Ankle PF/DF        Ankle Inv/Ev         Adduction ball squeeze         Hip machine Abd  30 #  20 x Abd  30 #  20 x Abd  30 # 20 x Abd 30 #  20 x    Back extension  2x15  55 # 2x15   85# 2 x 15   85# 85# 2x15   Bridges 20 x   20 x  30 x  5 \" 5\" 30x   Ther Activity                        Gait Training                        Modalities                               "

## 2024-12-26 NOTE — LETTER
2024    Shabbir Webster DPM  [address] [facility] [address]  [facility] [address]    Patient: Shivam Mccoy   YOB: 1985   Date of Visit: 2024     Encounter Diagnosis     ICD-10-CM    1. Acute left ankle pain  M25.572       2. Pseudarthrosis after fusion or arthrodesis  M96.0       3. Lumbar spondylosis  M47.816       4. Acquired left clubfoot  M21.542           Dear Dr. Webster:    Thank you for your recent referral of Shivam Mccoy. Please review the attached evaluation summary from Shivam's recent visit.     Please verify that you agree with the plan of care by signing the attached order.     If you have any questions or concerns, please do not hesitate to call.     I sincerely appreciate the opportunity to share in the care of one of your patients and hope to have another opportunity to work with you in the near future.       Sincerely,    Oskar Steve, PT      Referring Provider:      I certify that I have read the below Plan of Care and certify the need for these services furnished under this plan of treatment while under my care.                    Shabbir Webster DPM  [address]  [facility] [address]  Via Fax: [phone]          PT Re-Evaluation     Today's date: 2024  Patient name: Shivam Mccoy  : 1985  MRN: [id number]  Referring provider: Shabbir Webster DPM  Dx:   Encounter Diagnosis     ICD-10-CM    1. Acute left ankle pain  M25.572       2. Pseudarthrosis after fusion or arthrodesis  M96.0       3. Lumbar spondylosis  M47.816       4. Acquired left clubfoot  M21.542           Start Time: 1500  Stop Time: 1545  Total time in clinic (min): 45 minutes    Assessment  Impairments: abnormal gait, abnormal or restricted ROM, activity intolerance, impaired balance, impaired physical strength, lacks appropriate home exercise program, pain with function and activity limitations  Symptom irritability: moderate    Assessment details:  Patient has attended 10 physical therapy visits to date. He continues to restriction to L ankle mobility which is to be expected secondary to the surgical procedure he had performed. He continues to present with weakness of the L ankle through the available range. Great toe mobility is improving along with intrinsic strength as noted during therapy interventions. There was improved tolerance for ambulation and balance activities today with less UE assistance required.   Understanding of Dx/Px/POC: good     Prognosis: good    Goals  STGs:  Patient will be independent with HEP by 2-3 visits. - MET  Decrease ankle pain by 50% for improved tolerance with adls and home duties by 4-6 weeks. - Progressing  Patient will be able to ambulate over household distances with the least necessary assistive device by 4-6 weeks. - Progressing    LTGs:   Improve FOTO score from 35 to 55 indicating improved tolerance with activities involving the LE by discharge. - Progressing  Patient will be able to ambulate over community distances without deviation and free of an assistance by discharge. - Not MET  Patient will be able to ambulate up and down a flight of stairs without pain or deviation by discharge. - Progressing  Improve L LE strength to wnl for improved tolerance with adls and ambulation by discharge. - Progressing    Plan  Patient would benefit from: skilled physical therapy  Planned modality interventions: cryotherapy    Planned therapy interventions: balance/weight bearing training, ADL retraining, manual therapy, patient education, strengthening, stretching, therapeutic activities, therapeutic exercise, flexibility, functional ROM exercises, gait training and home exercise program    Frequency: 2-3x week  Duration in weeks: 4  Plan of Care beginning date: 12/26/2024  Plan of Care expiration date: 1/23/2025  Treatment plan discussed with: patient  Plan details: Patient informed that from this point forward, to ensure  adherence to the aforementioned plan of care, all or some of the treatment may be performed and carried out by a Physical Therapy Assistant (PTA) with supervision from a licensed Physical Therapist (PT) in accordance with [facility] Physical Therapy Practice Act.  Patient will continue to benefit from skilled physical therapy to address the functional deficits that were identified during the evaluation today. We will continue to progress the therapy program to address these functional deficits and achieve the established goals.         Subjective Evaluation    History of Present Illness  Date of surgery: 2024  Mechanism of injury: surgery  Mechanism of injury: Patient presents to out patient physical therapy with chief c/o L ankle pain. Patient is s/p L ankle revision fusion. He was NWB for 1.5 months and then WBAT in a high tide cam boot for 1.5 months. He has since transitioned into a regular sneaker and is WBAT. He is reporting a sensation of a wad of something being under the L heel when he is trying to WB to the L LE. Patient has his first surgery on the L foot in  and further surgical intervention for fusion surgery in  which failed so he had a revision surgery earlier this year. Patient has a history of club foot     Update 2024:  Patient reports that he continues to have difficulties with tolerance for standing/walking due to increased pain in the L ankle/foot. He feels that most of the pain is on the plantar heel of the L foot. He notes that when he is off of his feet he does not have pain but finds that there is some discomfort in the ankle and foot itself.           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  Pain  Current pain ratin  At best pain rating: 3  At  "worst pain rating: 10  Location: L foot/ankle  Quality: discomfort, throbbing, dull ache and pressure  Relieving factors: rest and ice  Aggravating factors: standing, walking and stair climbing    Social Support    Employment status: not working      Objective     Active Range of Motion   Left Ankle/Foot   Dorsiflexion (kf): -4 degrees   Plantar flexion: 16 degrees   Inversion: 1 degrees   Eversion: 17 degrees   Great toe flexion: -16 degrees   Great toe extension: 66 degrees     Strength/Myotome Testing     Left Hip   Planes of Motion   Flexion: 4  Extension: 4+  Abduction: 4+  Adduction: 4+  External rotation: 4-  Internal rotation: 4+    Right Hip   Planes of Motion   Flexion: 4  Extension: 4+  Abduction: 4+  Adduction: 4+  External rotation: 4+  Internal rotation: 4+    Left Knee   Flexion: 4+  Extension: 4+    Right Knee   Flexion: 4+  Extension: 4    Left Ankle/Foot   Dorsiflexion: 3+  Plantar flexion: 3+  Inversion: 3+  Eversion: 3+    Right Ankle/Foot   Dorsiflexion: 4+  Plantar flexion: 4+  Inversion: 4+  Eversion: 4+    Tests   Left Ankle/Foot   Negative for Homans' sign.     Right Ankle/Foot   Negative for Homans' sign.     Ambulation     Observational Gait   Gait: antalgic   Decreased walking speed.              Precautions: Hx of L foot and ankle fusion surgery 7/2/2024.       Date 12/9 12/11 12/16 12/18 12/26 Reassess Foot   FOTO   Back 48   Ankle 38   JF     Manuals        PROM Forefoot/Midfoot  8 min 8 min 8 min 8 min                           Neuro Re-Ed        Toe curls 5\" 20 x  15x5\" 15 x  5 \"     Toe yoga 5\" 20x  5\" 20x 15x ea   5\"  15 x     Toe splaying 15x  15x 5\" 15 x 5\"    SLB 15'5 x airex 20'5 x airex 5x10\" B  5x  10 \" both 20\" 4x Airex   Tandem walk 5 laps 7 laps 7 laps 7 laps 7 laps   Side stepping on airex 5 laps 7 laps  7 laps 7 laps 7 laps   Foot doming  5\" 20x 5\"  20 x 5\" 20 x            Ther Ex        Side lying hip ER isometric 5\" 15x 5\" 20x 2 # 5\" 20 x 2 # 5\" 15 x     Clamshells L2  " "10\" 10x 10\" 10 x 10 \" 10 x    Nustep L 5 6 min  8 min   L 6 5 min L 5 L7 6 min   Recumbent bike  L 1 7 min L 1 7 min 8 min L 1    Ankle PF/DF        Ankle Inv/Ev         Adduction ball squeeze         Hip machine Abd  30 #  20 x Abd  30 #  20 x Abd  30 # 20 x Abd 30 #  20 x    Back extension  2x15  55 # 2x15   85# 2 x 15   85# 85# 2x15   Bridges 20 x   20 x  30 x  5 \" 5\" 30x   Ther Activity                        Gait Training                        Modalities                                             "

## 2024-12-27 ENCOUNTER — EVALUATION (OUTPATIENT)
Dept: PHYSICAL THERAPY | Facility: CLINIC | Age: 39
End: 2024-12-27
Payer: COMMERCIAL

## 2024-12-27 DIAGNOSIS — M21.542 ACQUIRED LEFT CLUBFOOT: ICD-10-CM

## 2024-12-27 DIAGNOSIS — M47.816 LUMBAR SPONDYLOSIS: Primary | ICD-10-CM

## 2024-12-27 DIAGNOSIS — M96.0 PSEUDARTHROSIS AFTER FUSION OR ARTHRODESIS: ICD-10-CM

## 2024-12-27 DIAGNOSIS — M25.572 ACUTE LEFT ANKLE PAIN: ICD-10-CM

## 2024-12-27 PROCEDURE — 97112 NEUROMUSCULAR REEDUCATION: CPT | Performed by: PHYSICAL THERAPIST

## 2024-12-27 PROCEDURE — 97110 THERAPEUTIC EXERCISES: CPT | Performed by: PHYSICAL THERAPIST

## 2024-12-27 PROCEDURE — 97530 THERAPEUTIC ACTIVITIES: CPT | Performed by: PHYSICAL THERAPIST

## 2024-12-27 NOTE — PROGRESS NOTES
"PT Re-Evaluation     Today's date: 2024  Patient name: Shivam Mccoy  : 1985  MRN: [id number]  Referring provider: Shivam Juarez DO  Dx:   Encounter Diagnosis     ICD-10-CM    1. Lumbar spondylosis  M47.816       2. Acute left ankle pain  M25.572       3. Pseudarthrosis after fusion or arthrodesis  M96.0       4. Acquired left clubfoot  M21.542             Start Time: 1145  Stop Time: 1240  Total time in clinic (min): 55 minutes    Assessment  Impairments: abnormal or restricted ROM, activity intolerance, impaired physical strength, lacks appropriate home exercise program and pain with function  Symptom irritability: low    Assessment details: Patient is presenting with improvements noted to the lumbar spine during the evaluation today regarding rom and B/L Les regarding proximal joint strength. There was good response to therapy interventions which focused on progression of strengthening to the core/trunk and B/L Les.   Understanding of Dx/Px/POC: good     Prognosis: good    Goals  STGs:  \"Patient will be independent with hep by 2-3 visits.   Decrease low back pain by 25% for improved tolerance with adls and home duties by 3-4 weeks.   Improve Lumbar rom to wfl for improved tolerance with adls and home duties by 3-4 weeks. \"    LTGs:  \"Improve FOTO score from 52 to 63 indicating improved tolerance with activities involving the low back by discharge.   Patient will demonstrate rom and strength to the lumbar spine wfl for improved tolerance with adls and home duties by discharge.   Patient will be free of radicular symptoms by discharge. \"      Plan  Patient would benefit from: skilled physical therapy  Planned modality interventions: cryotherapy and thermotherapy: hydrocollator packs    Planned therapy interventions: abdominal trunk stabilization, ADL retraining, body mechanics training, flexibility, functional ROM exercises, home exercise program, therapeutic exercise, therapeutic activities, " stretching, strengthening, postural training, patient education, joint mobilization and manual therapy    Frequency: 2x week  Duration in weeks: 4  Plan of Care beginning date: 12/27/2024  Plan of Care expiration date: 1/24/2025  Treatment plan discussed with: patient  Plan details: Patient informed that from this point forward, to ensure adherence to the aforementioned plan of care, all or some of the treatment may be performed and carried out by a Physical Therapy Assistant (PTA) with supervision from a licensed Physical Therapist (PT) in accordance with [facility] Physical Therapy Practice Act.  Patient will continue to benefit from skilled physical therapy to address the functional deficits that were identified during the evaluation today. We will continue to progress the therapy program to address these functional deficits and achieve the established goals.              Subjective Evaluation    History of Present Illness  Date of surgery: 7/2/2024  Mechanism of injury: surgery  Mechanism of injury: Patient presents to out patient physical therapy with chief c/o L ankle pain. Patient is s/p L ankle revision fusion. He was NWB for 1.5 months and then WBAT in a high tide cam boot for 1.5 months. He has since transitioned into a regular sneaker and is WBAT. He is reporting a sensation of a wad of something being under the L heel when he is trying to WB to the L LE. Patient has his first surgery on the L foot in 2017 and further surgical intervention for fusion surgery in 2023 which failed so he had a revision surgery earlier this year. Patient has a history of club foot     Update 11/26/2024:  Patient reports that his back has been bothering him since he was in [employer]. He reports intermittent radicular symptoms into the lower extremities at times. He feels that when he is sitting for prolonged periods of time these symptoms will increase into the two legs. He has a known history of degeneration in his  low back which he feels is from how he had had to compensate for his foot issues.     Update 2024:  Patient reports that his low back continues to bother him. He notes that activity continues to increase the pain in his low back. He feels that when he is resting this will allow the pain to subside. He finds that when he moves a certain way he will get a pain in his low back but denies any pain radiating into his Les.           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  Pain  Current pain ratin  At best pain ratin  At worst pain ratin  Location: Lumbar  Quality: discomfort, throbbing, dull ache, radiating and sharp  Relieving factors: rest, ice and medications  Aggravating factors: standing, walking, lifting and sitting    Social Support    Employment status: not working      Objective     Palpation   Left   Muscle spasm in the erector spinae.   Tenderness of the erector spinae.     Right   Muscle spasm in the erector spinae.   Tenderness of the erector spinae.     Active Range of Motion     Lumbar   Flexion:  WFL  Extension:  with pain Restriction level: minimal  Left lateral flexion:  with pain Restriction level: minimal  Right lateral flexion:  with pain Restriction level: minimal  Left rotation:  WFL  Right rotation:  WFL  Left Ankle/Foot   Dorsiflexion (kf): -2 degrees   Plantar flexion: 11 degrees   Inversion: -8 degrees   Eversion: 17 degrees   Great toe flexion: -12 degrees   Great toe extension: 52 degrees     Strength/Myotome Testing     Left Hip   Planes of Motion   Flexion: 4+  Extension: 4+  Abduction: 4+  Adduction: 4+  External rotation: 4  Internal rotation: 4+    Right Hip   Planes of Motion   Flexion: 4+  Extension: 4+  Abduction: 4+  Adduction:  "4+  External rotation: 4+  Internal rotation: 4+    Left Knee   Flexion: 4+  Extension: 4+    Right Knee   Flexion: 4+  Extension: 4+    Left Ankle/Foot   Dorsiflexion: 3+  Plantar flexion: 3+  Inversion: 3+  Eversion: 3+    Right Ankle/Foot   Dorsiflexion: 4+  Plantar flexion: 4+  Inversion: 4+  Eversion: 4+    Muscle Activation     Additional Muscle Activation Details  Aberrant firing of L multifidus.   Multifidus: R=4-/5, L=4-/5    Tests     Lumbar     Left   Negative passive SLR.     Right   Negative passive SLR.   Left Ankle/Foot   Negative for Homans' sign.     Right Ankle/Foot   Negative for Homans' sign.              Precautions: Hx of L foot and ankle fusion surgery 7/2/2024.     Date 12/27 Reassess Lumbar 12/11 12/16 12/18 12/26 Reassess Foot   FOTO   Back 48   Ankle 38   JF     Manuals        PROM Forefoot/Midfoot  8 min 8 min 8 min 8 min                           Neuro Re-Ed        Toe curls   15x5\" 15 x  5 \"     Toe yoga  5\" 20x 15x ea   5\"  15 x     Toe splaying   15x 5\" 15 x 5\"    SLB Airex 30\" 4x ea.  20'5 x airex 5x10\" B  5x  10 \" both 20\" 4x Airex   Tandem walk 7 laps airex 7 laps 7 laps 7 laps 7 laps   Side stepping on airex 7 laps 7 laps  7 laps 7 laps 7 laps   Foot doming  5\" 20x 5\"  20 x 5\" 20 x            Ther Ex        Side lying hip ER isometric 4# 10\" 10x 5\" 20x 2 # 5\" 20 x 2 # 5\" 15 x     Clamshells L2 10\" 10x 10\" 10x 10\" 10 x 10 \" 10 x    Nustep for strengthening L8 7 min  8 min   L 6 5 min L 5 L7 6 min   Recumbent bike  L 1 7 min L 1 7 min 8 min L 1    Ankle PF/DF        Ankle Inv/Ev         Adduction ball squeeze         Hip machine  Abd  30 #  20 x Abd  30 # 20 x Abd 30 #  20 x    Back extension 90# 2x25 2x15  55 # 2x15   85# 2 x 15   85# 85# 2x15   Objective updates 10 min       Bridges   20 x  30 x  5 \" 5\" 30x   Ther Activity        Pallof Press  Standing P2 3\" 20x ea.        Quadruped multifidus 5\" 15x ea.        Gait Training                        Modalities                      "

## 2024-12-27 NOTE — LETTER
2024    Shivam Batistaroxy Juarez DO  [address]  [facility] [address]    Patient: Shivam Mccoy   YOB: 1985   Date of Visit: 2024     Encounter Diagnosis     ICD-10-CM    1. Lumbar spondylosis  M47.816       2. Acute left ankle pain  M25.572       3. Pseudarthrosis after fusion or arthrodesis  M96.0       4. Acquired left clubfoot  M21.542           Dear Dr. Juarez:    Thank you for your recent referral of Shivam Mccoy. Please review the attached evaluation summary from Shivam's recent visit.     Please verify that you agree with the plan of care by signing the attached order.     If you have any questions or concerns, please do not hesitate to call.     I sincerely appreciate the opportunity to share in the care of one of your patients and hope to have another opportunity to work with you in the near future.       Sincerely,    Oskar Steve, PT      Referring Provider:      I certify that I have read the below Plan of Care and certify the need for these services furnished under this plan of treatment while under my care.                    Shivam Theo Juarez DO  [address]  [facility] [address]  Via Fax: [phone]          PT Re-Evaluation     Today's date: 2024  Patient name: Shivam Mccoy  : 1985  MRN: [id number]  Referring provider: Sihvam Juarez DO  Dx:   Encounter Diagnosis     ICD-10-CM    1. Lumbar spondylosis  M47.816       2. Acute left ankle pain  M25.572       3. Pseudarthrosis after fusion or arthrodesis  M96.0       4. Acquired left clubfoot  M21.542             Start Time: 1145  Stop Time: 1240  Total time in clinic (min): 55 minutes    Assessment  Impairments: abnormal or restricted ROM, activity intolerance, impaired physical strength, lacks appropriate home exercise program and pain with function  Symptom irritability: low    Assessment details: Patient is presenting with improvements noted to the  "lumbar spine during the evaluation today regarding rom and B/L Les regarding proximal joint strength. There was good response to therapy interventions which focused on progression of strengthening to the core/trunk and B/L Les.   Understanding of Dx/Px/POC: good     Prognosis: good    Goals  STGs:  \"Patient will be independent with hep by 2-3 visits.   Decrease low back pain by 25% for improved tolerance with adls and home duties by 3-4 weeks.   Improve Lumbar rom to wfl for improved tolerance with adls and home duties by 3-4 weeks. \"    LTGs:  \"Improve FOTO score from 52 to 63 indicating improved tolerance with activities involving the low back by discharge.   Patient will demonstrate rom and strength to the lumbar spine wfl for improved tolerance with adls and home duties by discharge.   Patient will be free of radicular symptoms by discharge. \"      Plan  Patient would benefit from: skilled physical therapy  Planned modality interventions: cryotherapy and thermotherapy: hydrocollator packs    Planned therapy interventions: abdominal trunk stabilization, ADL retraining, body mechanics training, flexibility, functional ROM exercises, home exercise program, therapeutic exercise, therapeutic activities, stretching, strengthening, postural training, patient education, joint mobilization and manual therapy    Frequency: 2x week  Duration in weeks: 4  Plan of Care beginning date: 12/27/2024  Plan of Care expiration date: 1/24/2025  Treatment plan discussed with: patient  Plan details: Patient informed that from this point forward, to ensure adherence to the aforementioned plan of care, all or some of the treatment may be performed and carried out by a Physical Therapy Assistant (PTA) with supervision from a licensed Physical Therapist (PT) in accordance with [facility] Physical Therapy Practice Act.  Patient will continue to benefit from skilled physical therapy to address the functional deficits that were " identified during the evaluation today. We will continue to progress the therapy program to address these functional deficits and achieve the established goals.              Subjective Evaluation    History of Present Illness  Date of surgery: 7/2/2024  Mechanism of injury: surgery  Mechanism of injury: Patient presents to out patient physical therapy with chief c/o L ankle pain. Patient is s/p L ankle revision fusion. He was NWB for 1.5 months and then WBAT in a high tide cam boot for 1.5 months. He has since transitioned into a regular sneaker and is WBAT. He is reporting a sensation of a wad of something being under the L heel when he is trying to WB to the L LE. Patient has his first surgery on the L foot in 2017 and further surgical intervention for fusion surgery in 2023 which failed so he had a revision surgery earlier this year. Patient has a history of club foot     Update 11/26/2024:  Patient reports that his back has been bothering him since he was in [employer]. He reports intermittent radicular symptoms into the lower extremities at times. He feels that when he is sitting for prolonged periods of time these symptoms will increase into the two legs. He has a known history of degeneration in his low back which he feels is from how he had had to compensate for his foot issues.     Update 12/27/2024:  Patient reports that his low back continues to bother him. He notes that activity continues to increase the pain in his low back. He feels that when he is resting this will allow the pain to subside. He finds that when he moves a certain way he will get a pain in his low back but denies any pain radiating into his Les.           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  Pain  Current pain ratin  At best pain ratin  At worst pain ratin  Location: Lumbar  Quality: discomfort, throbbing, dull ache, radiating and sharp  Relieving factors: rest, ice and medications  Aggravating factors: standing, walking, lifting and sitting    Social Support    Employment status: not working      Objective     Palpation   Left   Muscle spasm in the erector spinae.   Tenderness of the erector spinae.     Right   Muscle spasm in the erector spinae.   Tenderness of the erector spinae.     Active Range of Motion     Lumbar   Flexion:  WFL  Extension:  with pain Restriction level: minimal  Left lateral flexion:  with pain Restriction level: minimal  Right lateral flexion:  with pain Restriction level: minimal  Left rotation:  WFL  Right rotation:  WFL  Left Ankle/Foot   Dorsiflexion (kf): -2 degrees   Plantar flexion: 11 degrees   Inversion: -8 degrees   Eversion: 17 degrees   Great toe flexion: -12 degrees   Great toe extension: 52 degrees     Strength/Myotome Testing     Left Hip   Planes of Motion   Flexion: 4+  Extension: 4+  Abduction: 4+  Adduction: 4+  External rotation: 4  Internal rotation: 4+    Right Hip   Planes of Motion   Flexion: 4+  Extension: 4+  Abduction: 4+  Adduction: 4+  External rotation: 4+  Internal rotation: 4+    Left Knee   Flexion: 4+  Extension: 4+    Right Knee   Flexion: 4+  Extension: 4+    Left Ankle/Foot   Dorsiflexion: 3+  Plantar flexion: 3+  Inversion: 3+  Eversion: 3+    Right Ankle/Foot   Dorsiflexion: 4+  Plantar flexion: 4+  Inversion: 4+  Eversion: 4+    Muscle Activation     Additional Muscle Activation Details  Aberrant firing of L multifidus.   Multifidus: R=4-/5, L=4-/5    Tests     Lumbar     Left   Negative passive SLR.     Right   Negative passive SLR.   Left Ankle/Foot   Negative for Homans' sign.     Right Ankle/Foot   Negative for Homans' sign.              Precautions: Hx of L foot and ankle fusion surgery 2024.     Date   "Reassess Lumbar 12/11 12/16 12/18 12/26 Reassess Foot   FOTO   Back 48   Ankle 38   JF     Manuals        PROM Forefoot/Midfoot  8 min 8 min 8 min 8 min                           Neuro Re-Ed        Toe curls   15x5\" 15 x  5 \"     Toe yoga  5\" 20x 15x ea   5\"  15 x     Toe splaying   15x 5\" 15 x 5\"    SLB Airex 30\" 4x ea.  20'5 x airex 5x10\" B  5x  10 \" both 20\" 4x Airex   Tandem walk 7 laps airex 7 laps 7 laps 7 laps 7 laps   Side stepping on airex 7 laps 7 laps  7 laps 7 laps 7 laps   Foot doming  5\" 20x 5\"  20 x 5\" 20 x            Ther Ex        Side lying hip ER isometric 4# 10\" 10x 5\" 20x 2 # 5\" 20 x 2 # 5\" 15 x     Clamshells L2 10\" 10x 10\" 10x 10\" 10 x 10 \" 10 x    Nustep for strengthening L8 7 min  8 min   L 6 5 min L 5 L7 6 min   Recumbent bike  L 1 7 min L 1 7 min 8 min L 1    Ankle PF/DF        Ankle Inv/Ev         Adduction ball squeeze         Hip machine  Abd  30 #  20 x Abd  30 # 20 x Abd 30 #  20 x    Back extension 90# 2x25 2x15  55 # 2x15   85# 2 x 15   85# 85# 2x15   Objective updates 10 min       Bridges   20 x  30 x  5 \" 5\" 30x   Ther Activity        Pallof Press  Standing P2 3\" 20x ea.        Quadruped multifidus 5\" 15x ea.        Gait Training                        Modalities                                                 "

## 2024-12-30 ENCOUNTER — OFFICE VISIT (OUTPATIENT)
Dept: PHYSICAL THERAPY | Facility: CLINIC | Age: 39
End: 2024-12-30
Payer: COMMERCIAL

## 2024-12-30 DIAGNOSIS — M96.0 PSEUDARTHROSIS AFTER FUSION OR ARTHRODESIS: Primary | ICD-10-CM

## 2024-12-30 DIAGNOSIS — M47.816 LUMBAR SPONDYLOSIS: ICD-10-CM

## 2024-12-30 DIAGNOSIS — M25.572 ACUTE LEFT ANKLE PAIN: ICD-10-CM

## 2024-12-30 PROCEDURE — 97112 NEUROMUSCULAR REEDUCATION: CPT

## 2024-12-30 PROCEDURE — 97110 THERAPEUTIC EXERCISES: CPT

## 2024-12-30 PROCEDURE — 97530 THERAPEUTIC ACTIVITIES: CPT

## 2024-12-30 NOTE — PROGRESS NOTES
"Daily Note     Today's date: 2024  Patient name: Shivam Mccoy  : 1985  MRN: [id number]  Referring provider: Shabbir Webster DPM  Dx:   Encounter Diagnosis     ICD-10-CM    1. Pseudarthrosis after fusion or arthrodesis  M96.0       2. Acute left ankle pain  M25.572       3. Lumbar spondylosis  M47.816           Start Time: 0845  Stop Time: 936  Total time in clinic (min): 51 minutes    Subjective:  My back isnt too bad today.  It bothers me more when I am moving.,  I have tightness and soreness when I am at rest.   My ankle is about the same. I have pain that radiates . I have the swelling in my ankle and it is was sore.        Objective: See treatment diary below      Assessment: Tolerated treatment well. Patient would benefit from continued PT   pt completes his standing exercises today with some pain noted through out in his left foot and ankle . Pt reports having back pain as well through out his session today,. We will increase his program as he is able to tolerate. Pt had some mild swelling in his left ankle today and reports that it is always there. He reports having steady pain in his pain with therapy today.       Plan: Continue per plan of care.      Precautions: Hx of L foot and ankle fusion surgery 2024.     Date  Reassess Lumbar  Reassess Foot   FOTO   Back 48   Ankle 38   JF     Manuals        PROM Forefoot/Midfoot  8 min 8 min 8 min 8 min                           Neuro Re-Ed        Toe curls   15x5\" 15 x  5 \"     Toe yoga   15x ea   5\"  15 x     Toe splaying   15x 5\" 15 x 5\"    SLB Airex 30\" 4x ea.  20'5 x airex 5x10\" B  5x  10 \" both 20\" 4x Airex   Tandem walk 7 laps airex 7 laps 7 laps 7 laps 7 laps   Side stepping on airex 7 laps 7 laps  7 laps 7 laps 7 laps   Foot doming   5\"  20 x 5\" 20 x            Ther Ex        Side lying hip ER isometric 4# 10\" 10x 5\" 20x 4# 5\" 20 x 2 # 5\" 15 x     Clamshells L2 10\" 10x 10\" 10x 10\" 10 x 10 \" 10 x    Nustep for " "strengthening L8 7 min L 8 7 min 8 min   L 6 5 min L 5 L7 6 min   Recumbent bike  L 1 7 min L 1 7 min 8 min L 1    Ankle PF/DF        Ankle Inv/Ev         Adduction ball squeeze         Hip machine  Abd  30 #  20 x Abd  30 # 20 x Abd 30 #  20 x    Back extension 90# 2x25 2x15  90 # Increase this time 2 x 15   85# 85# 2x15   Objective updates 10 min       Bridges   20 x  30 x  5 \" 5\" 30x   Ther Activity        Pallof Press  Standing P2 3\" 20x ea.  Stand P 2  3\" 20 x       Quadruped multifidus 5\" 15x ea.  5\" 15 x      Gait Training                        Modalities                                 "

## 2025-01-03 ENCOUNTER — OFFICE VISIT (OUTPATIENT)
Dept: PHYSICAL THERAPY | Facility: CLINIC | Age: 40
End: 2025-01-03
Payer: COMMERCIAL

## 2025-01-03 DIAGNOSIS — M25.572 ACUTE LEFT ANKLE PAIN: ICD-10-CM

## 2025-01-03 DIAGNOSIS — M96.0 PSEUDARTHROSIS AFTER FUSION OR ARTHRODESIS: ICD-10-CM

## 2025-01-03 DIAGNOSIS — M47.816 LUMBAR SPONDYLOSIS: Primary | ICD-10-CM

## 2025-01-03 PROCEDURE — 97110 THERAPEUTIC EXERCISES: CPT

## 2025-01-03 NOTE — PROGRESS NOTES
"Daily Note     Today's date: 1/3/2025  Patient name: Shivam Mccoy  : 1985  MRN: [id number]  Referring provider: Shabbir Webster DPM  Dx:   Encounter Diagnosis     ICD-10-CM    1. Lumbar spondylosis  M47.816       2. Acute left ankle pain  M25.572       3. Pseudarthrosis after fusion or arthrodesis  M96.0           Start Time: 1145  Stop Time: 1230  Total time in clinic (min): 45 minutes    Subjective:  My ankle is stiff and sore. I have more trouble walking.       Objective: See treatment diary below      Assessment: Tolerated treatment well. Patient would benefit from continued PT   pt completed his full program today with good tolerance.  He does state having mild low back pain through out that was irritating to him.  He states that he always feels the pain in his back.  He will be getting fitted with his ankle brace next Wednesday. Pt states that he will continue to work on his foot exercises at home,.       Plan: Continue per plan of care.      Precautions: Hx of L foot and ankle fusion surgery 2024.     Date  Reassess Lumbar 12/ 30 1/3/25 12/18 12/26 Reassess Foot   FOTO        Manuals        PROM Forefoot/Midfoot  8 min  8 min 8 min                           Neuro Re-Ed        Toe curls    15 x  5 \"     Toe yoga    15 x     Toe splaying    15 x 5\"    SLB Airex 30\" 4x ea.  20'5 x airex 5x10\" B  5x  10 \" both 20\" 4x Airex   Tandem walk 7 laps airex 7 laps 7 laps 7 laps 7 laps   Side stepping on airex 7 laps 7 laps  7 laps 7 laps 7 laps   Foot doming    5\" 20 x            Ther Ex        Side lying hip ER isometric 4# 10\" 10x 5\" 20x 4# 5\" 20 x 2 # 5\" 15 x     Clamshells L2 10\" 10x 10\" 10x 10\" 10 x 10 \" 10 x    Nustep for strengthening L8 7 min L 8 7 min 8 min   L 6 5 min L 5 L7 6 min   Recumbent bike  L 1 7 min L 1 7 min 8 min L 1    Ankle PF/DF        Ankle Inv/Ev         Adduction ball squeeze         Hip machine  Abd  30 #  20 x Abd  30 # 20 x Abd 30 #  20 x    Back extension 90# 2x25 2x15  " "90 # 100# 2 x 15  2 x 15   85# 85# 2x15   Objective updates 10 min       Bridges   20 x  30 x  5 \" 5\" 30x   Ther Activity        Pallof Press  Standing P2 3\" 20x ea.  Stand P 2  3\" 20 x  Stand P 2   20 x     Quadruped multifidus 5\" 15x ea.  5\" 15 x 5\"  15 x     Gait Training                        Modalities                                   "

## 2025-01-09 ENCOUNTER — APPOINTMENT (OUTPATIENT)
Dept: PHYSICAL THERAPY | Facility: CLINIC | Age: 40
End: 2025-01-09
Payer: COMMERCIAL

## 2025-01-09 NOTE — PROGRESS NOTES
At this time patient wishes to hold on therapy secondary to starting a new job and his insurance changing. We will discharge the episode and patient is to contact the office if he wishes to return.